# Patient Record
Sex: FEMALE | Race: WHITE | Employment: OTHER | URBAN - METROPOLITAN AREA
[De-identification: names, ages, dates, MRNs, and addresses within clinical notes are randomized per-mention and may not be internally consistent; named-entity substitution may affect disease eponyms.]

---

## 2017-04-11 ENCOUNTER — HOSPITAL ENCOUNTER (OUTPATIENT)
Dept: CT IMAGING | Facility: HOSPITAL | Age: 82
Discharge: HOME | End: 2017-04-11
Attending: INTERNAL MEDICINE
Payer: MEDICARE

## 2017-04-11 DIAGNOSIS — F44.89 CONFUSION STATE: ICD-10-CM

## 2017-04-11 PROCEDURE — 70450 CT HEAD/BRAIN W/O DYE: CPT

## 2018-03-30 ENCOUNTER — APPOINTMENT (OUTPATIENT)
Dept: GENERAL RADIOLOGY | Facility: HOSPITAL | Age: 83
End: 2018-03-30
Attending: EMERGENCY MEDICINE
Payer: MEDICARE

## 2018-03-30 ENCOUNTER — APPOINTMENT (OUTPATIENT)
Dept: CT IMAGING | Facility: HOSPITAL | Age: 83
End: 2018-03-30
Attending: EMERGENCY MEDICINE
Payer: MEDICARE

## 2018-03-30 PROCEDURE — 71045 X-RAY EXAM CHEST 1 VIEW: CPT | Performed by: EMERGENCY MEDICINE

## 2018-03-30 PROCEDURE — 70450 CT HEAD/BRAIN W/O DYE: CPT | Performed by: EMERGENCY MEDICINE

## 2018-03-30 NOTE — ED PROVIDER NOTES
Patient Seen in: [facility] Emergency Department    History   Patient presents with:  Altered Mental Status (neurologic)    Stated Complaint: aggitation    HPI    The patient is a 80-year-old female resident at [facility], brought in accompanied % [03/30/18 1614]  O2 Device: n/a    Current:/80   Pulse 77   Temp 98 °F (36.7 °C)   Resp 20   Ht 167.6 cm (5' 6\")   Wt 55 kg   SpO2 95%   BMI 19.57 kg/m²         Physical Exam    General: Alert and oriented x 2 and very agitated  Neuro: No focal ne limits   HCV ANTIBODY - Normal   HIVS EXPOSURE ONLY - Normal   SOURCE PANEL (EXPOSURE)    Narrative: The following orders were created for panel order SOURCE PANEL (EXPOSURE).   Procedure                               Abnormality         Status transmitted to the [facility]) Steve. Homer Wray [address] ([address]) which includes the Dose Index Registry.   PATIENT STATED HISTORY: (As transcribed by Technologist)  Patient comes from nursing home with agitation, and aggressive Technologist)  Agitated. FINDINGS:  Patient mildly rotated to the left. Normal heart size and pulmonary vascularity. Mildly tortuous and calcified aorta. Mild left basilar scarring or atelectasis. No focal consolidation.   Arthritic changes of both s

## 2018-03-31 NOTE — ED NOTES
Report called to [address] at [facility], excepting MD Lorraine Enamorado, bed number 549 #1

## 2018-03-31 NOTE — ED NOTES
No geriatric beds available at [facility]. [facility] accepting packed of information. No geriatrid unit at [facility] or [facility]. No response from [facility].
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
## 2018-03-31 NOTE — ED NOTES
Esme Mrorison RN called from [facility] stated concerned regarding WBC's and states she is contacting excepting MD prior to patient transfer.  She is to call back

## 2019-09-24 PROBLEM — M62.81 GENERALIZED MUSCLE WEAKNESS: Status: ACTIVE | Noted: 2019-09-24

## 2021-03-04 DIAGNOSIS — Z23 NEED FOR VACCINATION: ICD-10-CM
